# Patient Record
Sex: FEMALE | Race: BLACK OR AFRICAN AMERICAN | Employment: FULL TIME | URBAN - METROPOLITAN AREA
[De-identification: names, ages, dates, MRNs, and addresses within clinical notes are randomized per-mention and may not be internally consistent; named-entity substitution may affect disease eponyms.]

---

## 2019-02-07 ENCOUNTER — TRANSFERRED RECORDS (OUTPATIENT)
Dept: PHYSICAL THERAPY | Facility: CLINIC | Age: 49
End: 2019-02-07

## 2019-03-07 ENCOUNTER — THERAPY VISIT (OUTPATIENT)
Dept: PHYSICAL THERAPY | Facility: CLINIC | Age: 49
End: 2019-03-07
Payer: COMMERCIAL

## 2019-03-07 DIAGNOSIS — M25.511 CHRONIC RIGHT SHOULDER PAIN: ICD-10-CM

## 2019-03-07 DIAGNOSIS — G89.29 CHRONIC RIGHT SHOULDER PAIN: ICD-10-CM

## 2019-03-07 PROCEDURE — 97140 MANUAL THERAPY 1/> REGIONS: CPT | Mod: GP | Performed by: PHYSICAL THERAPIST

## 2019-03-07 PROCEDURE — 97110 THERAPEUTIC EXERCISES: CPT | Mod: GP | Performed by: PHYSICAL THERAPIST

## 2019-03-07 PROCEDURE — 97161 PT EVAL LOW COMPLEX 20 MIN: CPT | Mod: GP | Performed by: PHYSICAL THERAPIST

## 2019-03-07 NOTE — LETTER
[facility] ATHLETIC [facility] PHYSICAL THERAPY  [address]  [facility] [address]  [phone]    2019    Re: Veena Gray   :   1970  MRN:  [id number]   REFERRING PHYSICIAN:   Barbi Stevens    [facility]    Date of Initial Evaluation:  2019  Visits:  Rxs Used: 1  Reason for Referral:  Chronic right shoulder pain    EVALUATION SUMMARY    Subjective:  The history is provided by the patient. No  was used.   Veena Gray is a 48 year old female with a right shoulder condition.  Condition occurred with:  Repetition/overuse.  Condition occurred: at home.  This is a chronic condition  I had been doing a lot of carrying of bags on the right shoulder.  I had the shoulder pain before from carrying groceries and it went away.   This shoulder pain started up a couple of months ago on or about 2018.  The pain as not gotten better and it hurts when I lay on it and try to lift it up..    Patient reports pain:  Anterior and posterior.  Radiates to:  Cervical.  Pain is described as sharp and is intermittent and reported as 4/10.  Associated with: clicking. Pain is the same all the time.  Symptoms are exacerbated by lying on extremity, using arm at shoulder level, using arm overhead and using arm behind back and relieved by rest.  Since onset symptoms are gradually improving.  Special testing: none.  Previous treatment: none.    General health as reported by patient is good.  Pertinent medical history includes:  Overweight (sinus).  Medical allergies: no.  Surgical history: lipo.  Medication history: vitamin, calcium, antacid, nasal inhaler.  Current occupation is , [employer].  Patient is working in normal job without restrictions.  Primary job tasks include:  Driving and prolonged sitting (site visits).  Barriers: apartment elevator/stairs.  Red  flags:  None as reported by the patient.                 Objective:  Standing Alignment:    Cervical/Thoracic:  Forward head (fair minus sitting posture)  Shoulder/UE:  Rounded shoulders, scapular abduction L and scapular abduction R (internal rotation of bilateral shoulders)  Flexibility/Screens:   Positive screens:  Shoulder  Upper Extremity:    Decreased right upper extremity flexibility present at:  Pectoralis Major; Pectoralis Minor and Latissimus  Spine:  Decreased right spine flexibility:  Scalenes; Upper Trap and Levator  Re: Veena Gray   :   1970          Shoulder Evaluation:  ROM:  AROM:    Flexion:  Left:  WFL    Right:  135 with pain  Abduction:  Left: WFL   Right:  130 with pain  Flexion/External Rotation:  Left:  Upper thoracic    Right:  Neck  Extension/Internal Rotation:  Left:  Lower thoracic    Right:  Upper gluteal     Pain: reach to opposite shoulder right touch, left WFL, CROM flexion, extension WFL, rotation 75% of motion  Endfeel: patient is left handed  Strength:    Flexion: Left:5/5   Pain:    Right: 4+/5     Pain:   Abduction:  Left: 5/5  Pain:    Right: 4+/5     Pain:  Adduction:  Left: 5/5    Pain:    Right: 5/5     Pain:  Internal Rotation:  Left:5/5     Pain:    Right: 5/5      Pain:+  External Rotation:   Left:5/5     Pain:   Right:4+/5      Pain:-/+    Elbow Flexion:  Left:5/5     Pain:    Right:5/5     Pain:  Elbow Extension:  Left:5/5     Pain:    Right:5/5     Pain:  Stability Testing:    Left shoulder stability negative testing:  Sulcus sign  Right shoulder stability negative testing:  Sulcus sign  Special Tests:    Right shoulder positive for the following special tests:Impingement  Palpation:    Right shoulder tenderness present at: Supraspinatus; Levator and Upper Trap  Mobility Tests:    Glenohumeral posterior right:  Hypomobile  Glenohumeral inferior right:  Hypomobile    Scapulohumeral rhythm right:  Hypermobile         Assessment/Plan:    Patient is a 48  year old female with right side shoulder complaints.    Patient has the following significant findings with corresponding treatment plan.                Diagnosis 1:  Right shoulder pain/impingement  Pain -  hot/cold therapy, manual therapy, self management, education and home program  Decreased ROM/flexibility - manual therapy, therapeutic exercise, therapeutic activity and home program  Decreased joint mobility - manual therapy, therapeutic exercise, therapeutic activity and home program  Decreased strength - therapeutic exercise, therapeutic activities and home program  Impaired muscle performance - neuro re-education and home program  Decreased function - therapeutic activities and home program  Impaired posture - neuro re-education, therapeutic activities and home program    Therapy Evaluation Codes:   Cumulative Therapy Evaluation is: Low complexity.    Previous and current functional limitations:  (See Goal Flow Sheet for this information)    Short term and Long term goals: (See Goal Flow Sheet for this information)     Re: Veena Galeano Isaac   :   1970    Communication ability:  Patient appears to be able to clearly communicate and understand verbal and written communication and follow directions correctly.  Treatment Explanation - The following has been discussed with the patient:   RX ordered/plan of care  Possible risks and side effects  This patient would benefit from PT intervention to resume normal activities.   Rehab potential is good.    Frequency:  1 X week, once daily  Duration:  for 6 weeks  Discharge Plan:  Achieve all LTG.  Independent in home treatment program.    Thank you for your referral.    INQUIRIES  Therapist: Paulette Hill, PT  [facility] [place] PH[facility]ICAL THERAPY  [address] [address]  Phone: [phone]  Fax: [phone]

## 2019-03-07 NOTE — PROGRESS NOTES
[facility] Initial Evaluation  Subjective:  The history is provided by the patient. No  was used.   Veena Gray is a 48 year old female with a right shoulder condition.  Condition occurred with:  Repetition/overuse.  Condition occurred: at home.  This is a chronic condition  I had been doing a lot of carrying of bags on the right shoulder.  I had the shoulder pain before from carrying groceries and it went away.   This shoulder pain started up a couple of months ago on or about 11/1/2018.  The pain as not gotten better and it hurts when I lay on it and try to lift it up..    Patient reports pain:  Anterior and posterior.  Radiates to:  Cervical.  Pain is described as sharp and is intermittent and reported as 4/10.  Associated with: clicking. Pain is the same all the time.  Symptoms are exacerbated by lying on extremity, using arm at shoulder level, using arm overhead and using arm behind back and relieved by rest.  Since onset symptoms are gradually improving.  Special testing: none.  Previous treatment: none.    General health as reported by patient is good.  Pertinent medical history includes:  Overweight (sinus).  Medical allergies: no.  Surgical history: lipo.  Medication history: vitamin, calcium, antacid, nasal inhaler.  Current occupation is , [employer].  Patient is working in normal job without restrictions.  Primary job tasks include:  Driving and prolonged sitting (site visits).    Barriers: apartment elevator/stairs.    Red flags:  None as reported by the patient.                        Objective:  Standing Alignment:    Cervical/Thoracic:  Forward head (fair minus sitting posture)  Shoulder/UE:  Rounded shoulders, scapular abduction L and scapular abduction R (internal rotation of bilateral shoulders)                  Flexibility/Screens:   Positive screens:  Shoulder  Upper Extremity:        Decreased right upper extremity flexibility  present at:  Pectoralis Major; Pectoralis Minor and Latissimus    Spine:      Decreased right spine flexibility:  Scalenes; Upper Trap and Levator                       Shoulder Evaluation:  ROM:  AROM:    Flexion:  Left:  WFL    Right:  135 with pain    Abduction:  Left: WFL   Right:  130 with pain                Flexion/External Rotation:  Left:  Upper thoracic    Right:  Neck  Extension/Internal Rotation:  Left:  Lower thoracic    Right:  Upper gluteal       Pain: reach to opposite shoulder right touch, left WFL, CROM flexion, extension WFL, rotation 75% of motion  Endfeel: patient is left handed  Strength:    Flexion: Left:5/5   Pain:    Right: 4+/5     Pain:     Abduction:  Left: 5/5  Pain:    Right: 4+/5     Pain:  Adduction:  Left: 5/5    Pain:    Right: 5/5     Pain:  Internal Rotation:  Left:5/5     Pain:    Right: 5/5      Pain:+  External Rotation:   Left:5/5     Pain:   Right:4+/5      Pain:-/+        Elbow Flexion:  Left:5/5     Pain:    Right:5/5     Pain:  Elbow Extension:  Left:5/5     Pain:    Right:5/5     Pain:  Stability Testing:      Left shoulder stability negative testing:  Sulcus sign    Right shoulder stability negative testing:  Sulcus sign  Special Tests:      Right shoulder positive for the following special tests:Impingement  Palpation:      Right shoulder tenderness present at: Supraspinatus; Levator and Upper Trap  Mobility Tests:      Glenohumeral posterior right:  Hypomobile  Glenohumeral inferior right:  Hypomobile          Scapulohumeral rhythm right:  Hypermobile                                   General     ROS    Assessment/Plan:    Patient is a 48 year old female with right side shoulder complaints.    Patient has the following significant findings with corresponding treatment plan.                Diagnosis 1:  Right shoulder pain/impingement  Pain -  hot/cold therapy, manual therapy, self management, education and home program  Decreased ROM/flexibility - manual therapy,  therapeutic exercise, therapeutic activity and home program  Decreased joint mobility - manual therapy, therapeutic exercise, therapeutic activity and home program  Decreased strength - therapeutic exercise, therapeutic activities and home program  Impaired muscle performance - neuro re-education and home program  Decreased function - therapeutic activities and home program  Impaired posture - neuro re-education, therapeutic activities and home program    Therapy Evaluation Codes:   Cumulative Therapy Evaluation is: Low complexity.    Previous and current functional limitations:  (See Goal Flow Sheet for this information)    Short term and Long term goals: (See Goal Flow Sheet for this information)     Communication ability:  Patient appears to be able to clearly communicate and understand verbal and written communication and follow directions correctly.  Treatment Explanation - The following has been discussed with the patient:   RX ordered/plan of care  Possible risks and side effects  This patient would benefit from PT intervention to resume normal activities.   Rehab potential is good.    Frequency:  1 X week, once daily  Duration:  for 6 weeks  Discharge Plan:  Achieve all LTG.  Independent in home treatment program.    Please refer to the daily flowsheet for treatment today, total treatment time and time spent performing 1:1 timed codes.

## 2019-03-14 ENCOUNTER — THERAPY VISIT (OUTPATIENT)
Dept: PHYSICAL THERAPY | Facility: CLINIC | Age: 49
End: 2019-03-14
Payer: COMMERCIAL

## 2019-03-14 DIAGNOSIS — G89.29 CHRONIC RIGHT SHOULDER PAIN: ICD-10-CM

## 2019-03-14 DIAGNOSIS — M25.511 CHRONIC RIGHT SHOULDER PAIN: ICD-10-CM

## 2019-03-14 PROCEDURE — 97110 THERAPEUTIC EXERCISES: CPT | Mod: GP | Performed by: PHYSICAL THERAPIST

## 2019-03-14 PROCEDURE — 97112 NEUROMUSCULAR REEDUCATION: CPT | Mod: GP | Performed by: PHYSICAL THERAPIST

## 2019-03-14 PROCEDURE — 97140 MANUAL THERAPY 1/> REGIONS: CPT | Mod: GP | Performed by: PHYSICAL THERAPIST

## 2019-04-04 ENCOUNTER — THERAPY VISIT (OUTPATIENT)
Dept: PHYSICAL THERAPY | Facility: CLINIC | Age: 49
End: 2019-04-04
Payer: COMMERCIAL

## 2019-04-04 DIAGNOSIS — M25.511 CHRONIC RIGHT SHOULDER PAIN: ICD-10-CM

## 2019-04-04 DIAGNOSIS — G89.29 CHRONIC RIGHT SHOULDER PAIN: ICD-10-CM

## 2019-04-04 PROCEDURE — 97110 THERAPEUTIC EXERCISES: CPT | Mod: GP | Performed by: PHYSICAL THERAPIST

## 2019-04-04 PROCEDURE — 97140 MANUAL THERAPY 1/> REGIONS: CPT | Mod: GP | Performed by: PHYSICAL THERAPIST

## 2019-04-04 PROCEDURE — 97112 NEUROMUSCULAR REEDUCATION: CPT | Mod: GP | Performed by: PHYSICAL THERAPIST

## 2019-05-23 ENCOUNTER — THERAPY VISIT (OUTPATIENT)
Dept: PHYSICAL THERAPY | Facility: CLINIC | Age: 49
End: 2019-05-23
Payer: COMMERCIAL

## 2019-05-23 DIAGNOSIS — M25.511 CHRONIC RIGHT SHOULDER PAIN: ICD-10-CM

## 2019-05-23 DIAGNOSIS — G89.29 CHRONIC RIGHT SHOULDER PAIN: ICD-10-CM

## 2019-05-23 PROCEDURE — 97140 MANUAL THERAPY 1/> REGIONS: CPT | Mod: GP | Performed by: PHYSICAL THERAPIST

## 2019-05-23 PROCEDURE — 97112 NEUROMUSCULAR REEDUCATION: CPT | Mod: GP | Performed by: PHYSICAL THERAPIST

## 2019-05-23 PROCEDURE — 97110 THERAPEUTIC EXERCISES: CPT | Mod: GP | Performed by: PHYSICAL THERAPIST

## 2019-05-23 NOTE — PROGRESS NOTES
Subjective:  HPI                    Objective:  System    Physical Exam    General     ROS    Assessment/Plan:    PROGRESS  REPORT    Progress reporting period is from 3/7/2019 to 5/23/2019.       SUBJECTIVE  Subjective changes noted by patient:   I am about 95% of my normal,  I am better than when I came.  I still have pain when I reach behind and out to the side.   Current Pain level: 0/10(painful arc 1-2/10).     Initial Pain level: 4/10.   Changes in function:  Yes (See Goal flowsheet attached for changes in current functional level)  Adverse reaction to treatment or activity: None    OBJECTIVE  Changes noted in objective findings:  Yes, Right AROM flexion 158, abduction 140 painful arc, opposite shoulder touch, behind the head to lower neck, behind back to middle lumbar with tightness.  Strength biceps/triceps 5/5, IR slight pain, ER 5-/5, flexion 5-/5 abduction 5-/5, adduction 5/5.  Tightness in posterior capsule.  Cueing for scapular control.  Decrease in GH movement.    ASSESSMENT/PLAN  Updated problem list and treatment plan: Diagnosis 1:  Right shoulder pain   Pain -  manual therapy, self management, education and home program  Decreased ROM/flexibility - manual therapy, therapeutic exercise, therapeutic activity and home program  Decreased joint mobility - manual therapy, therapeutic exercise, therapeutic activity and home program  Decreased strength - therapeutic exercise, therapeutic activities and home program  Impaired muscle performance - neuro re-education and home program  Decreased function - therapeutic activities and home program  STG/LTGs have been met or progress has been made towards goals:  Yes (See Goal flow sheet completed today.)  Assessment of Progress: The patient's condition is improving.  The patient's condition has potential to improve.  Self Management Plans:  Patient has been instructed in a home treatment program.  Patient  has been instructed in self management of symptoms.  I  have re-evaluated this patient and find that the nature, scope, duration and intensity of the therapy is appropriate for the medical condition of the patient.  Veena continues to require the following intervention to meet STG and LTG's:  PT    Recommendations:  This patient would benefit from continued therapy.     Frequency:  1 X week, once daily  Duration:  Every two weeks for 4 weeks for 2 more visits      Therapy was interrupted for a month due to vacations and weather    Please refer to the daily flowsheet for treatment today, total treatment time and time spent performing 1:1 timed codes.

## 2019-05-23 NOTE — LETTER
[facility]LETIC [facility], [facility] PHYSICAL THERAPY  [address]  [facility] [address]  [phone]    May 23, 2019    Re: Veena Gray   :   1970  MRN:  [id number]   REFERRING PHYSICIAN:   Barbi Stevens    [facility] PHYSICAL [facility]    Date of Initial Evaluation:  3/7/2019  Visits:  Rxs Used: 4  Reason for Referral:  Chronic right shoulder pain     PROGRESS  REPORT  Progress reporting period is from 3/7/2019 to 2019.       SUBJECTIVE  Subjective changes noted by patient: I am about 95% of my normal, I am better than when I came.  I still have pain when I reach behind and out to the side.  Current Pain level: 0/10 (painful arc 1-2/10).     Initial Pain level: 4/10.   Changes in function: Yes (See Goal flowsheet attached for changes in current functional level)  Adverse reaction to treatment or activity: None    OBJECTIVE  Changes noted in objective findings:  Yes, Right AROM flexion 158, abduction 140 painful arc, opposite shoulder touch, behind the head to lower neck, behind back to middle lumbar with tightness.  Strength biceps/triceps 5/5, IR slight pain, ER 5-/5, flexion 5-/5 abduction 5-/5, adduction 5/5.  Tightness in posterior capsule.  Cueing for scapular control.  Decrease in GH movement.    ASSESSMENT/PLAN  Updated problem list and treatment plan:   Diagnosis 1: Right shoulder pain     Pain - manual therapy, self management, education and home program  Decreased ROM/flexibility - manual therapy, therapeutic exercise, therapeutic activity and home program  Decreased joint mobility - manual therapy, therapeutic exercise, therapeutic activity and home program  Decreased strength - therapeutic exercise, therapeutic activities and home program  Impaired muscle performance - neuro re-education and home program  Decreased function - therapeutic activities and home program  STG/LTGs have been met or  progress has been made towards goals: Yes (See Goal flow sheet completed today.)  Assessment of Progress: The patient's condition is improving.  The patient's condition has potential to improve.  Self Management Plans: Patient has been instructed in a home treatment program.  Re: Veena Gray   :   1970    Patient has been instructed in self management of symptoms.  I have re-evaluated this patient and find that the nature, scope, duration and intensity of the therapy is appropriate for the medical condition of the patient.  Veena continues to require the following intervention to meet STG and LTG's: PT    Recommendations:  This patient would benefit from continued therapy.     Frequency: 1 X week, once daily  Duration: Every two weeks for 4 weeks for 2 more visits    Therapy was interrupted for a month due to vacations and weather.        Thank you for your referral.    INQUIRIES  Therapist: Paulette Hill, PT   [facility] PHYSICAL THERAPY  [address] [address]  Phone: [phone]  Fax: [phone]

## 2019-06-06 ENCOUNTER — THERAPY VISIT (OUTPATIENT)
Dept: PHYSICAL THERAPY | Facility: CLINIC | Age: 49
End: 2019-06-06
Payer: COMMERCIAL

## 2019-06-06 DIAGNOSIS — M25.511 CHRONIC RIGHT SHOULDER PAIN: ICD-10-CM

## 2019-06-06 DIAGNOSIS — G89.29 CHRONIC RIGHT SHOULDER PAIN: ICD-10-CM

## 2019-06-06 PROCEDURE — 97110 THERAPEUTIC EXERCISES: CPT | Mod: GP | Performed by: PHYSICAL THERAPIST

## 2019-06-06 PROCEDURE — 97140 MANUAL THERAPY 1/> REGIONS: CPT | Mod: GP | Performed by: PHYSICAL THERAPIST

## 2019-06-06 PROCEDURE — 97112 NEUROMUSCULAR REEDUCATION: CPT | Mod: GP | Performed by: PHYSICAL THERAPIST

## 2019-07-11 ENCOUNTER — THERAPY VISIT (OUTPATIENT)
Dept: PHYSICAL THERAPY | Facility: CLINIC | Age: 49
End: 2019-07-11
Payer: COMMERCIAL

## 2019-07-11 DIAGNOSIS — G89.29 CHRONIC RIGHT SHOULDER PAIN: ICD-10-CM

## 2019-07-11 DIAGNOSIS — M25.511 CHRONIC RIGHT SHOULDER PAIN: ICD-10-CM

## 2019-07-11 PROCEDURE — 97140 MANUAL THERAPY 1/> REGIONS: CPT | Mod: GP | Performed by: PHYSICAL THERAPIST

## 2019-07-11 PROCEDURE — 97110 THERAPEUTIC EXERCISES: CPT | Mod: GP | Performed by: PHYSICAL THERAPIST

## 2019-07-11 PROCEDURE — 97112 NEUROMUSCULAR REEDUCATION: CPT | Mod: GP | Performed by: PHYSICAL THERAPIST

## 2019-07-11 NOTE — PROGRESS NOTES
Subjective:  HPI                    Objective:  System    Physical Exam    General     ROS    Assessment/Plan:    DISCHARGE REPORT    Progress reporting period is from 3/7/2019 to 7/11/2019.       SUBJECTIVE  Subjective changes noted by patient:   I am doing better.  I am about 97% of my normal.  I can reach better, but on a bra on     Current Pain level: 0/10(with some movement 0-1/10).     Initial Pain level: 4/10.   Changes in function:  Yes (See Goal flowsheet attached for changes in current functional level)  Adverse reaction to treatment or activity: None    OBJECTIVE  Changes noted in objective findings:  Yes,  AROM flexion WFL,, abduction WFL slight end range 1-2/10.  Opposite shoulder WFL, behind head to upper thoracic, behind back to lower thoracic.  Strength Flexion 5/5, abduction 5/5, IR/ER 5/5 biceps, triceps 5/5.  Continue to cue for scapular position and stabilization.  Tightness in pectoralis major, minor and posterior capsule.  Discussed with patient progression of exercises and home exercise program.        ASSESSMENT/PLAN  Updated problem list and treatment plan: Diagnosis 1:  Right shoulder pain   Decreased joint mobility - home program  Impaired muscle performance - home program  Decreased function - home program  STG/LTGs have been met or progress has been made towards goals:  Yes (See Goal flow sheet completed today.)  Assessment of Progress: The patient's condition is improving.  The patient's condition has potential to improve.  Self Management Plans:  Patient has been instructed in a home treatment program.  Patient  has been instructed in self management of symptoms.      Recommendations:  This patient is ready to be discharged from therapy and continue their home treatment program.  Patient to call if any questions.  Patient has been instructed in progression of exercises and resistance.  Thank you for the opportunity of working with this motivated individual.    Please refer to the daily  flowsheet for treatment today, total treatment time and time spent performing 1:1 timed codes.

## 2019-07-11 NOTE — LETTER
[facility] ATHLETIC [facility] PHYSICAL THERAPY  [address]  [facility] [address]  [phone]    2019    Re: Veena Gray   :   1970  MRN:  [id number]   REFERRING PHYSICIAN:   Barbi Stevens    [facility]    Date of Initial Evaluation:  3/7/2019  Visits:  Rxs Used: 6  Reason for Referral:  Chronic right shoulder pain      DISCHARGE REPORT  Progress reporting period is from 3/7/2019 to 2019.       SUBJECTIVE  Subjective changes noted by patient: I am doing better.  I am about 97% of my normal.  I can reach better, put on a bra.    Current Pain level: 0/10 (with some movement 0-1/10).     Initial Pain level: 4/10.   Changes in function: Yes (See Goal flowsheet attached for changes in current functional level)  Adverse reaction to treatment or activity: None    OBJECTIVE  Changes noted in objective findings: Yes:   AROM flexion WFL, abduction WFL slight end range 1-2/10.  Opposite shoulder WFL, behind head to upper thoracic, behind back to lower thoracic.    Strength: Flexion 5/5, abduction 5/5, IR/ER 5/5 biceps, triceps 5/5.  Continue to cue for scapular position and stabilization.  Tightness in pectoralis major, minor and posterior capsule.    Discussed with patient progression of exercises and home exercise program.      ASSESSMENT/PLAN  Updated problem list and treatment plan:   Diagnosis 1: Right shoulder pain     Decreased joint mobility - home program  Impaired muscle performance - home program  Decreased function - home program  STG/LTGs have been met or progress has been made towards goals: Yes (See Goal flow sheet completed today.)  Assessment of Progress: The patient's condition is improving.  The patient's condition has potential to improve.  Self Management Plans: Patient has been instructed in a home treatment program.  Patient has been instructed in self management of  symptoms.      Re: Veena Gray   :   1970    Recommendations:  This patient is ready to be discharged from therapy and continue their home treatment program.  Patient to call if any questions.  Patient has been instructed in progression of exercises and resistance.  Thank you for the opportunity of working with this motivated individual.          Thank you for your referral.    INQUIRIES  Therapist: Paulette Hill PT   [facility] [place] [facility]ICAL [facility]ERAP[facility]  [address] [address]  Phone: [phone]  Fax: [phone]